# Patient Record
Sex: MALE | Race: WHITE
[De-identification: names, ages, dates, MRNs, and addresses within clinical notes are randomized per-mention and may not be internally consistent; named-entity substitution may affect disease eponyms.]

---

## 2020-01-10 ENCOUNTER — HOSPITAL ENCOUNTER (OUTPATIENT)
Dept: HOSPITAL 74 - JASU-SURG | Age: 75
Discharge: HOME | End: 2020-01-10
Attending: UROLOGY
Payer: COMMERCIAL

## 2020-01-10 VITALS — DIASTOLIC BLOOD PRESSURE: 73 MMHG | HEART RATE: 83 BPM | SYSTOLIC BLOOD PRESSURE: 135 MMHG

## 2020-01-10 VITALS — TEMPERATURE: 98.1 F

## 2020-01-10 VITALS — BODY MASS INDEX: 22.4 KG/M2

## 2020-01-10 DIAGNOSIS — C61: Primary | ICD-10-CM

## 2020-01-10 PROCEDURE — 0V503ZZ DESTRUCTION OF PROSTATE, PERCUTANEOUS APPROACH: ICD-10-PCS | Performed by: UROLOGY

## 2020-01-10 PROCEDURE — 55873 CRYOABLATE PROSTATE: CPT

## 2020-01-10 NOTE — OP
Operative Note





- Note:


Operative Date: 01/10/20


Operation: prostate cryoablation and cystoscopy


Findings: 





prostate ca


Post-Operative Diagnosis: Same as Pre-op


Surgeon: Manuel Wynne


Anesthesiologist/CRNA: Elisha Perez


Anesthesia: General


Estimated Blood Loss (mls): 0


Drains & Tubes with Location: 18 fr mendes


Operative Report Dictated: Yes

## 2020-01-10 NOTE — OP
DATE OF OPERATION:  01/10/2020

 

PREOPERATIVE DIAGNOSIS:  Prostate cancer.

 

POSTOPERATIVE DIAGNOSIS:  Prostate cancer.

 

PROCEDURE:  Prostate cryoablation and cystoscopy.

 

SURGEON:  Manuel Pfeiffer MD 

 

ASSISTANT:  None.

 

ANESTHESIA:  General via laryngeal mask.

 

ANESTHESIOLOGIST:  Elisha Perez MD 

 

SPECIMENS:  None.

 

CULTURES:  None.

 

DRAINS:  An 18-Telugu Copeland catheter.  

 

ESTIMATED BLOOD LOSS:  Negligible.

 

COMPLICATIONS:  None.

 

DESCRIPTION OF PROCEDURE:  Patient was brought in the operating room.  Placed on the

operating room table in a supine position.  After administration of general

anesthesia via laryngeal mask, intravenous antibiotics were administered.  Sequential

compression devices were placed.  Patient was placed in a dorsal lithotomy position. 

Perineum was shaved 1st, and perineum and genitals were prepped and draped in usual

sterile manner, 18-Telugu Copeland catheter was placed per urethra, 10 mL was placed in

the balloon.  Urine was evacuated.  The bladder was then filled with 400 mL of

sterile normal saline and clamped.  Transrectal ultrasound probe was placed per

rectum, and transurethral ultrasound of the prostate was done, and measurements were

taken, and a plan was devised for a prostate cryoablation 6 probes.  Once the plan

was devised, the probes were placed in the appropriate location then 2 temperature

sensors were placed, 1 in Denonvilliers fascia, 1 in the external sphincter.  Now

Copeland catheter was removed.  Flexible cystoscopy was performed.  This demonstrated

normal anterior urethra.  Prostatic urethra measured approximately 4-1/2 cm in length

and demonstrated moderate bilobar occlusion.  There were no probes penetrated in the

prostatic urethra.  The bladder was entered and thoroughly inspected.  There were no

foreign bodies, tumors, stones, or inflammation.  Both ureteral orifices were in

their usual location with a clear efflux bilaterally.  No probes penetrated the

bladder as well.  The scope was retroflexed, and this was confirmed.  Now Super Stiff

guidewire was passed through the cystoscope into the bladder, and the cystoscope was

removed.  The urethral warmer was passed over the Super Stiff guidewire into the

bladder, and urethral warming was started.  The guidewire was removed, and under

ultrasound guidance, the positioning of the probes was re-confirmed to be in good

position.  Now the prostate cryoablation was done with 2 freeze/thaw cycles.  At the

end of the procedure, temperature sensors and cryoablation probes were removed. 

Applying digital pressure on the perineum, hemostasis was assured.  Urethral warmer

was left in place an additional 5 minutes, and the urethral warmer was removed, and

an 18-Telugu Copeland catheter was replaced, 10 mL placed in the balloon.  Placed on

gravity drainage.  Returned blood tinged.  Patient tolerated procedure well, was

awoken from anesthesia in the operating room.  Sterile compressive dressing was

applied consisting of bacitracin, 4 x 4, and Tegaderm.  

 

 

MANUEL PFEIFFER M.D.

 

[id number]

DD: 01/10/2020 10:45

DT: 01/10/2020 13:14

Job #:  [id number]